# Patient Record
Sex: FEMALE | Race: WHITE | Employment: UNEMPLOYED | URBAN - METROPOLITAN AREA
[De-identification: names, ages, dates, MRNs, and addresses within clinical notes are randomized per-mention and may not be internally consistent; named-entity substitution may affect disease eponyms.]

---

## 2023-01-01 ENCOUNTER — APPOINTMENT (OUTPATIENT)
Dept: GENERAL RADIOLOGY | Age: 63
End: 2023-01-01

## 2023-01-01 ENCOUNTER — HOSPITAL ENCOUNTER (EMERGENCY)
Age: 63
End: 2023-06-19
Attending: EMERGENCY MEDICINE

## 2023-01-01 DIAGNOSIS — I46.9 CARDIOPULMONARY ARREST (HCC): Primary | ICD-10-CM

## 2023-01-01 LAB
ALBUMIN SERPL-MCNC: 3.1 G/DL (ref 3.4–5)
ALP SERPL-CCNC: 69 U/L (ref 40–129)
ALT SERPL-CCNC: 59 U/L (ref 10–40)
ANION GAP SERPL CALCULATED.3IONS-SCNC: 20 MMOL/L (ref 3–16)
APAP SERPL-MCNC: <5 UG/ML (ref 10–30)
AST SERPL-CCNC: 75 U/L (ref 15–37)
BASE EXCESS BLDA CALC-SCNC: -15.1 MMOL/L (ref -3–3)
BASOPHILS # BLD: 0.1 K/UL (ref 0–0.2)
BASOPHILS NFR BLD: 1 %
BILIRUB DIRECT SERPL-MCNC: <0.2 MG/DL (ref 0–0.3)
BILIRUB INDIRECT SERPL-MCNC: ABNORMAL MG/DL (ref 0–1)
BILIRUB SERPL-MCNC: <0.2 MG/DL (ref 0–1)
BUN SERPL-MCNC: 17 MG/DL (ref 7–20)
CALCIUM SERPL-MCNC: 8.9 MG/DL (ref 8.3–10.6)
CHLORIDE SERPL-SCNC: 100 MMOL/L (ref 99–110)
CO2 BLDA-SCNC: 20.9 MMOL/L
CO2 SERPL-SCNC: 16 MMOL/L (ref 21–32)
COHGB MFR BLDA: 2.8 % (ref 0–1.5)
CREAT SERPL-MCNC: 0.9 MG/DL (ref 0.6–1.2)
DEPRECATED RDW RBC AUTO: 14.4 % (ref 12.4–15.4)
EOSINOPHIL # BLD: 0 K/UL (ref 0–0.6)
EOSINOPHIL NFR BLD: 0 %
ETHANOLAMINE SERPL-MCNC: NORMAL MG/DL (ref 0–0.08)
GFR SERPLBLD CREATININE-BSD FMLA CKD-EPI: >60 ML/MIN/{1.73_M2}
GLUCOSE SERPL-MCNC: 349 MG/DL (ref 70–99)
HCO3 BLDA-SCNC: 18.3 MMOL/L (ref 21–29)
HCT VFR BLD AUTO: 38.5 % (ref 36–48)
HGB BLD-MCNC: 12.4 G/DL (ref 12–16)
HGB BLDA-MCNC: 12.9 G/DL (ref 12–16)
LIPASE SERPL-CCNC: 56 U/L (ref 13–60)
LYMPHOCYTES # BLD: 4.6 K/UL (ref 1–5.1)
LYMPHOCYTES NFR BLD: 58 %
MCH RBC QN AUTO: 31.2 PG (ref 26–34)
MCHC RBC AUTO-ENTMCNC: 32.2 G/DL (ref 31–36)
MCV RBC AUTO: 97 FL (ref 80–100)
METHGB MFR BLDA: 0.3 %
MONOCYTES # BLD: 0.4 K/UL (ref 0–1.3)
MONOCYTES NFR BLD: 5 %
NEUTROPHILS # BLD: 2.8 K/UL (ref 1.7–7.7)
NEUTROPHILS NFR BLD: 30 %
NEUTS BAND NFR BLD MANUAL: 6 % (ref 0–7)
NT-PROBNP SERPL-MCNC: 1765 PG/ML (ref 0–124)
O2 THERAPY: ABNORMAL
PCO2 BLDA: 84 MMHG (ref 35–45)
PH BLDA: 6.95 [PH] (ref 7.35–7.45)
PLATELET # BLD AUTO: 171 K/UL (ref 135–450)
PLATELET BLD QL SMEAR: ADEQUATE
PMV BLD AUTO: 9.7 FL (ref 5–10.5)
PO2 BLDA: 46.9 MMHG (ref 75–108)
POTASSIUM SERPL-SCNC: 4.5 MMOL/L (ref 3.5–5.1)
PROT SERPL-MCNC: 5.6 G/DL (ref 6.4–8.2)
RBC # BLD AUTO: 3.97 M/UL (ref 4–5.2)
SALICYLATES SERPL-MCNC: <0.3 MG/DL (ref 15–30)
SAO2 % BLDA: 58.8 %
SLIDE REVIEW: ABNORMAL
SMUDGE CELLS BLD QL SMEAR: PRESENT
SODIUM SERPL-SCNC: 136 MMOL/L (ref 136–145)
TROPONIN, HIGH SENSITIVITY: 11 NG/L (ref 0–14)
WBC # BLD AUTO: 7.9 K/UL (ref 4–11)

## 2023-01-01 PROCEDURE — 31500 INSERT EMERGENCY AIRWAY: CPT

## 2023-01-01 PROCEDURE — 36415 COLL VENOUS BLD VENIPUNCTURE: CPT

## 2023-01-01 PROCEDURE — 6360000002 HC RX W HCPCS: Performed by: EMERGENCY MEDICINE

## 2023-01-01 PROCEDURE — 85025 COMPLETE CBC W/AUTO DIFF WBC: CPT

## 2023-01-01 PROCEDURE — 99285 EMERGENCY DEPT VISIT HI MDM: CPT

## 2023-01-01 PROCEDURE — 82803 BLOOD GASES ANY COMBINATION: CPT

## 2023-01-01 PROCEDURE — 80048 BASIC METABOLIC PNL TOTAL CA: CPT

## 2023-01-01 PROCEDURE — 84484 ASSAY OF TROPONIN QUANT: CPT

## 2023-01-01 PROCEDURE — 2500000003 HC RX 250 WO HCPCS: Performed by: EMERGENCY MEDICINE

## 2023-01-01 PROCEDURE — 83880 ASSAY OF NATRIURETIC PEPTIDE: CPT

## 2023-01-01 PROCEDURE — 80143 DRUG ASSAY ACETAMINOPHEN: CPT

## 2023-01-01 PROCEDURE — 80179 DRUG ASSAY SALICYLATE: CPT

## 2023-01-01 PROCEDURE — 80076 HEPATIC FUNCTION PANEL: CPT

## 2023-01-01 PROCEDURE — 82077 ASSAY SPEC XCP UR&BREATH IA: CPT

## 2023-01-01 PROCEDURE — 83690 ASSAY OF LIPASE: CPT

## 2023-01-01 RX ORDER — EPINEPHRINE IN SOD CHLOR,ISO 1 MG/10 ML
SYRINGE (ML) INTRAVENOUS DAILY PRN
Status: COMPLETED | OUTPATIENT
Start: 2023-01-01 | End: 2023-01-01

## 2023-01-01 RX ORDER — AMIODARONE HYDROCHLORIDE 50 MG/ML
INJECTION, SOLUTION INTRAVENOUS DAILY PRN
Status: COMPLETED | OUTPATIENT
Start: 2023-01-01 | End: 2023-01-01

## 2023-01-01 RX ORDER — 0.9 % SODIUM CHLORIDE 0.9 %
1000 INTRAVENOUS SOLUTION INTRAVENOUS ONCE
Status: DISCONTINUED | OUTPATIENT
Start: 2023-01-01 | End: 2023-01-01 | Stop reason: HOSPADM

## 2023-01-01 RX ADMIN — SODIUM BICARBONATE 50 MEQ: 84 INJECTION INTRAVENOUS at 07:17

## 2023-01-01 RX ADMIN — EPINEPHRINE 1 MG: 0.1 INJECTION INTRACARDIAC; INTRAVENOUS at 07:17

## 2023-01-01 RX ADMIN — AMIODARONE HYDROCHLORIDE 150 MG: 50 INJECTION, SOLUTION INTRAVENOUS at 07:19

## 2023-01-01 RX ADMIN — EPINEPHRINE 1 MG: 0.1 INJECTION INTRACARDIAC; INTRAVENOUS at 07:13

## 2023-01-01 RX ADMIN — EPINEPHRINE 1 MG: 0.1 INJECTION INTRACARDIAC; INTRAVENOUS at 07:21

## 2023-01-01 RX ADMIN — EPINEPHRINE 1 MG: 0.1 INJECTION INTRACARDIAC; INTRAVENOUS at 07:26

## 2023-01-01 RX ADMIN — EPINEPHRINE 1 MG: 0.1 INJECTION INTRACARDIAC; INTRAVENOUS at 07:10

## 2023-06-19 NOTE — ED NOTES
TOD 0789 per Dr Anthony Oreilly at this time, family present      Shavon Nelson, DARWIN  06/19/23 6017

## 2023-06-19 NOTE — PROGRESS NOTES
provided emotional support, ministerial presence for family after Mrs. Guo's death. Family continues to be at bedside awaiting 's decision. 06/19/23 1007   Encounter Summary   Encounter Overview/Reason  Grief, Loss, and Adjustments   Service Provided For: Family   Referral/Consult From: Nurse   Support System Spouse; Children;Family members   Last Encounter  06/19/23  (Support to family at pt's death SM)   Complexity of Encounter Moderate   Begin Time 0740   End Time  1000   Total Time Calculated 140 min   Grief, Loss, and Adjustments   Type Death;Grief and loss   Assessment/Intervention/Outcome   Assessment Shock; Tearful   Intervention Discussed illness injury and its impact; Explored/Affirmed feelings, thoughts, concerns   Outcome Expressed feelings, needs, and concerns

## 2023-06-19 NOTE — ED NOTES
Pt's family remains at bedside at this time, awaiting return call from coroners office      Raya Willis RN  06/19/23 1915

## 2023-06-19 NOTE — ED NOTES
Spoke with Robert Rodgers at [facility], she stated that she will look further into pt's medical history and return call      Kiki Jose RN  06/19/23 9008

## 2023-06-19 NOTE — ED PROVIDER NOTES
Attending Supervisory Note/Shared Visit   I have personally performed a face to face diagnostic evaluation on this patient. I have reviewed the mid-levels findings and agree. History and Exam by me shows unresponsive morbidly obese white female with CPR in progress. Per EMS the call went out at [phone].  at scene reports ventricular fibrillation cardiac arrest.  Report that  woke up and heard gasping respirations. He called 911. He administered bystander CPR.  defibrillated patient. A supraglottic airway was placed. A left IO was placed. The patient received a total of 4 doses of epinephrine, 1 dose of bicarb, 300 mg of amiodarone, and was defibrillated x4 in route. Port 1 episode of a rhythm post defibrillation but no pulse that lasted approximately 2 minutes. On arrival CPR was in progress. Patient was being ventilated with a supraglottic airway. Left IO was in place. Pupils were 6 mm, midline and fixed. No spontaneous respiratory effort. Heart tones were absent. She had no carotid or femoral pulses. Her rhythm on the monitor was ventricular fibrillation on arrival.    HEENT: No head trauma. Pupils 6 mm, midline, nonresponsive. Heart: Heart tones absent. Pulmonary: No spontaneous respiratory effort. Breath sounds symmetrical with bagging. Abdomen: Obese, soft, no palpable masses. Musculoskeletal: No trauma, no edema. IO in the left tibia. Neuro: No eye-opening, no verbal, no motor. CPR was continued. Patient was intubated by Marybel Dalal CNP. See her procedure note. Drug administration was continued via ALS protocol including multiple doses of epinephrine, multiple defibrillation, an additional amp of bicarb, an additional 150 mg of amiodarone. No point in time during the resuscitation were we able to obtain a pulse or an organized rhythm. After a prolonged resuscitation the patient was pronounced dead at 0730.   The patient was pulseless and

## 2023-06-19 NOTE — ED NOTES
Coroners office notified, FELIPE zhu, Melissa from coroners will evaluate her records and return call      Brea Cano RN  06/19/23 2646
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
## 2023-06-19 NOTE — ED NOTES
Karla Parikh with [facility] and stated pt will NOT be a coroners case.       Kaylee Scherer RN  06/19/23 5194